# Patient Record
Sex: MALE | Race: WHITE | NOT HISPANIC OR LATINO | Employment: OTHER | URBAN - NONMETROPOLITAN AREA
[De-identification: names, ages, dates, MRNs, and addresses within clinical notes are randomized per-mention and may not be internally consistent; named-entity substitution may affect disease eponyms.]

---

## 2023-01-26 ENCOUNTER — TELEPHONE (OUTPATIENT)
Dept: MEDICAL GROUP | Facility: PHYSICIAN GROUP | Age: 63
End: 2023-01-26

## 2023-01-26 ENCOUNTER — HOSPITAL ENCOUNTER (OUTPATIENT)
Facility: MEDICAL CENTER | Age: 63
End: 2023-01-26
Attending: NURSE PRACTITIONER

## 2023-01-26 ENCOUNTER — OFFICE VISIT (OUTPATIENT)
Dept: MEDICAL GROUP | Facility: PHYSICIAN GROUP | Age: 63
End: 2023-01-26

## 2023-01-26 VITALS
HEART RATE: 80 BPM | DIASTOLIC BLOOD PRESSURE: 80 MMHG | BODY MASS INDEX: 27.06 KG/M2 | RESPIRATION RATE: 18 BRPM | SYSTOLIC BLOOD PRESSURE: 130 MMHG | OXYGEN SATURATION: 98 % | HEIGHT: 70 IN | WEIGHT: 189 LBS | TEMPERATURE: 96.8 F

## 2023-01-26 DIAGNOSIS — F51.01 PRIMARY INSOMNIA: ICD-10-CM

## 2023-01-26 DIAGNOSIS — Z13.6 SCREENING FOR CARDIOVASCULAR CONDITION: ICD-10-CM

## 2023-01-26 DIAGNOSIS — Z79.899 CHRONIC USE OF BENZODIAZEPINE FOR THERAPEUTIC PURPOSE: ICD-10-CM

## 2023-01-26 DIAGNOSIS — Z12.5 SCREENING FOR MALIGNANT NEOPLASM OF PROSTATE: ICD-10-CM

## 2023-01-26 PROCEDURE — 80307 DRUG TEST PRSMV CHEM ANLYZR: CPT

## 2023-01-26 PROCEDURE — 99203 OFFICE O/P NEW LOW 30 MIN: CPT | Performed by: NURSE PRACTITIONER

## 2023-01-26 RX ORDER — ZOLPIDEM TARTRATE 10 MG/1
10 TABLET ORAL NIGHTLY PRN
Qty: 30 TABLET | Refills: 2 | Status: SHIPPED | OUTPATIENT
Start: 2023-01-26 | End: 2023-01-26 | Stop reason: SDUPTHER

## 2023-01-26 RX ORDER — ZOLPIDEM TARTRATE 10 MG/1
10 TABLET ORAL NIGHTLY PRN
Qty: 30 TABLET | Refills: 2 | Status: SHIPPED | OUTPATIENT
Start: 2023-01-26 | End: 2023-04-26

## 2023-01-26 RX ORDER — ZOLPIDEM TARTRATE 10 MG/1
10 TABLET ORAL NIGHTLY PRN
COMMUNITY
End: 2023-01-26

## 2023-01-26 ASSESSMENT — PATIENT HEALTH QUESTIONNAIRE - PHQ9: CLINICAL INTERPRETATION OF PHQ2 SCORE: 0

## 2023-01-26 NOTE — PATIENT INSTRUCTIONS
"Good sleep hygiene:    Try to get to bed at the same time every night (even on weekends).  Plan for 8 hours of sleep.  Maintain the same waking time every morning (even on weekends).  Rumsey the bedroom for sleeping and intimacy.  Do not watch TV in the bedroom. Do not read in bed.  No screen time (TV, smart phone, tablet) within an hour of bedtime.  Develop a \"sleeping ritual\"  Keep room dark and quiet.  Run a small fan for background noise.  Try some relaxation exercises.  Avoid caffeine after noon.    "

## 2023-01-26 NOTE — TELEPHONE ENCOUNTER
Cassie at [facility] called stating they accidentally deleted the prescription you had sent on his zolpidem and request you resend it.

## 2023-01-26 NOTE — PROGRESS NOTES
CC: Establish care, insomnia    HISTORY OF THE PRESENT ILLNESS: Patient is a 62 y.o. male. This pleasant patient is here today for evaluation and management of the following health problems.    Patient's previous primary care provider is in [place] whom patient has not seen in about a year.  Patient does have insurance through his wife's employer, but he is uncertain on what providers are covered.  He is seeing me today and choosing to pay out-of-pocket.  I have urged patient to find out what providers are covered on his insurance.  Patient is retired.  Remains active with [employer] and [employer].    Health Maintenance: Had colonoscopy 2 or 3 years ago while in [place].  Records have been requested today.      Primary insomnia  Patient is 62-year-old male here to establish care with me today.  Reports long history of insomnia that was well controlled with Ambien 10 mg nightly for over 7 years.  However, he has been out of medication for the last 7 months since he moved to the area from [place].  Has been trying to manage with melatonin.  Melatonin helps with sleep initiation, but does not last through the night.  Will wake after couple hours.  When taking Ambien, denied adverse effects including sleep eating or sleepwalking.  Rarely drinks alcohol.  Retired.  Exercising and staying active during the day.  Has never had sleep study, denies snoring.    Allergies: Patient has no known allergies.    Current Outpatient Medications Ordered in Epic   Medication Sig Dispense Refill    zolpidem (AMBIEN) 10 MG Tab Take 1 Tablet by mouth at bedtime as needed for Sleep for up to 90 days. 30 Tablet 2     No current Epic-ordered facility-administered medications on file.       History reviewed. No pertinent past medical history.    Past Surgical History:   Procedure Laterality Date    ROTATOR CUFF REPAIR Right        Social History     Tobacco Use    Smoking status: Never    Smokeless tobacco: Never   Vaping Use     "Vaping Use: Never used   Substance Use Topics    Alcohol use: Not Currently    Drug use: Never       Family History   Problem Relation Age of Onset    Breast Cancer Mother     Heart Disease Father     No Known Problems Sister     Liver Cancer Brother        ROS:     - Constitutional: Negative for fever, chills, unexpected weight change, and fatigue/generalized weakness.     - HEENT: Negative for headaches, vision changes, hearing changes, ear pain, rhinorrhea, sinus congestion, and sore throat.      - Respiratory: Negative for cough, dyspnea, and wheezing.      - Cardiovascular: Negative for chest pain, palpitations, orthopnea, and bilateral lower extremity edema.     - Gastrointestinal: Negative for heartburn, nausea, vomiting, abdominal pain, hematochezia, melena, diarrhea, constipation.     - Genitourinary: Negative for dysuria, polyuria, hematuria, pyuria, urinary urgency, and urinary incontinence.    - Musculoskeletal: Negative for myalgias, back pain, and joint pain.     - Skin: Negative for rash, itching, cyanotic skin color change.     - Neurological: Negative for dizziness, tingling, tremors, focal sensory deficit, focal weakness and headaches.     - Endo/Heme/Allergies: Does not bruise/bleed easily.     - Psychiatric/Behavioral: Negative for depression, suicidal/homicidal ideation and memory loss.           Exam: /80 (BP Location: Right arm)   Pulse 80   Temp 36 °C (96.8 °F) (Temporal)   Resp 18   Ht 1.778 m (5' 10\")   Wt 85.7 kg (189 lb)   SpO2 98%  Body mass index is 27.12 kg/m².    General: Alert, pleasant, hard of hearing, well nourished, well developed male in NAD  HEENT: Normocephalic. Eyes conjunctiva clear lids without ptosis, pupils equal and reactive to light, ears normal shape and contour, canals are clear bilaterally, tympanic membranes are pearly gray with good light reflex, nasal mucosa without erythema and drainage, oropharynx is without erythema, edema or exudates.   Neck: " Supple without bruit. Thyroid is not enlarged.  Pulmonary: Clear to ausculation.  Normal effort. No rales, ronchi, or wheezing.  Cardiovascular: Normal rate and rhythm without murmur. Carotid and radial pulses are intact and equal bilaterally.  No lower extremity edema.  Abdomen: Soft, nontender, nondistended. Normal bowel sounds. Liver and spleen are not palpable  Neurologic: Grossly nonfocal  Lymph: No cervical or supraclavicular lymph nodes are palpable  Skin: Warm and dry.   Musculoskeletal: Normal gait.   Psych: Normal mood and affect. Alert and oriented. Judgment and insight is normal.    Please note that this dictation was created using voice recognition software. I have made every reasonable attempt to correct obvious errors, but I expect that there are errors of grammar and possibly content that I did not discover before finalizing the note.      Assessment/Plan  1. Primary insomnia  Chronic problem.  Tolerated Ambien in the past and was very helpful.  Reasonable to start Ambien again.  Did advise to try half a tab to see if that is has helpful as a whole tab.   reviewed.  Controlled substance agreement reviewed with patient and patient signed.  Reviewed risks of Ambien including sleep eating, sleepwalking, falls, respiratory depression.  Patient understands that risks are increased if drinking alcohol.  Patient understands not to drink alcohol while taking Ambien.  He reports he rarely drinks alcohol.  - zolpidem (AMBIEN) 10 MG Tab; Take 1 Tablet by mouth at bedtime as needed for Sleep for up to 90 days.  Dispense: 30 Tablet; Refill: 2    2. Chronic use of benzodiazepine for therapeutic purpose  This patient is continuing to use a controlled substance (CS) on a long term basis.  The patient is thoroughly aware of the goals of treatment with the CS  The patient is aware that yearly and random urine drug screens are required.  The patient has been instructed to take the CS only as prescribed.  The patient  is prohibited from sharing the CS with any other person.  The patient is instructed to inform the provider if any other CS is taken, of any alcohol or cannabis or other recreational drug use, any treatment for side effects of the CS or complications, if they have CS active rx in other states  The patient has evidence for a reason for the CS  The treatment plan has been discussed with the patient  The  report has been reviewed    - Controlled Substance Treatment Agreement  - PAIN MANAGEMENT SCRN, W/ RFLX TO QNT; Future    3. Screening for cardiovascular condition  We will review lab results with patient and next appointment.  I did advise patient to go to [facility] as he is uncertain of his insurance coverage.  Advised on routine aerobic exercise as tolerated, Mediterranean diet for heart health.  - Comp Metabolic Panel; Future  - ESTIMATED GFR; Future  - Lipid Profile; Future    4. Screening for malignant neoplasm of prostate  Reviewed risks and benefits.  - PROSTATE SPECIFIC AG SCREENING; Future    Patient will return to clinic in 3 months for medication refill and lab review or sooner if needed.

## 2023-01-26 NOTE — ASSESSMENT & PLAN NOTE
Patient is 62-year-old male here to establish care with me today.  Reports long history of insomnia that was well controlled with Ambien 10 mg nightly for over 7 years.  However, he has been out of medication for the last 7 months since he moved to the area from [place].  Has been trying to manage with melatonin.  Melatonin helps with sleep initiation, but does not last through the night.  Will wake after couple hours.  When taking Ambien, denied adverse effects including sleep eating or sleepwalking.  Rarely drinks alcohol.  Retired.  Exercising and staying active during the day.  Has never had sleep study, denies snoring.

## 2023-01-30 LAB

## 2023-04-26 ENCOUNTER — APPOINTMENT (OUTPATIENT)
Dept: MEDICAL GROUP | Facility: PHYSICIAN GROUP | Age: 63
End: 2023-04-26

## 2024-03-25 ENCOUNTER — OFFICE VISIT (OUTPATIENT)
Dept: MEDICAL GROUP | Facility: PHYSICIAN GROUP | Age: 64
End: 2024-03-25
Payer: COMMERCIAL

## 2024-03-25 VITALS
RESPIRATION RATE: 12 BRPM | TEMPERATURE: 97.7 F | DIASTOLIC BLOOD PRESSURE: 80 MMHG | HEART RATE: 75 BPM | OXYGEN SATURATION: 95 % | BODY MASS INDEX: 28.09 KG/M2 | WEIGHT: 196.21 LBS | SYSTOLIC BLOOD PRESSURE: 120 MMHG | HEIGHT: 70 IN

## 2024-03-25 DIAGNOSIS — Z11.59 NEED FOR HEPATITIS C SCREENING TEST: Primary | ICD-10-CM

## 2024-03-25 DIAGNOSIS — Z00.00 ANNUAL PHYSICAL EXAM: ICD-10-CM

## 2024-03-25 DIAGNOSIS — H90.3 HEARING LOSS SENSORY, BILATERAL: ICD-10-CM

## 2024-03-25 DIAGNOSIS — Z79.899 CHRONIC USE OF BENZODIAZEPINE FOR THERAPEUTIC PURPOSE: ICD-10-CM

## 2024-03-25 DIAGNOSIS — Z12.12 SCREENING FOR COLORECTAL CANCER: ICD-10-CM

## 2024-03-25 DIAGNOSIS — Z13.220 LIPID SCREENING: ICD-10-CM

## 2024-03-25 DIAGNOSIS — Z12.11 SCREENING FOR COLORECTAL CANCER: ICD-10-CM

## 2024-03-25 DIAGNOSIS — Z12.5 SCREENING PSA (PROSTATE SPECIFIC ANTIGEN): ICD-10-CM

## 2024-03-25 DIAGNOSIS — F51.01 PRIMARY INSOMNIA: ICD-10-CM

## 2024-03-25 DIAGNOSIS — Z12.83 SKIN CANCER SCREENING: ICD-10-CM

## 2024-03-25 DIAGNOSIS — Z85.828 HX OF BASAL CELL CARCINOMA: ICD-10-CM

## 2024-03-25 DIAGNOSIS — L98.9 SKIN LESIONS: ICD-10-CM

## 2024-03-25 PROCEDURE — 3079F DIAST BP 80-89 MM HG: CPT | Performed by: STUDENT IN AN ORGANIZED HEALTH CARE EDUCATION/TRAINING PROGRAM

## 2024-03-25 PROCEDURE — 3074F SYST BP LT 130 MM HG: CPT | Performed by: STUDENT IN AN ORGANIZED HEALTH CARE EDUCATION/TRAINING PROGRAM

## 2024-03-25 PROCEDURE — 99214 OFFICE O/P EST MOD 30 MIN: CPT | Mod: 25 | Performed by: STUDENT IN AN ORGANIZED HEALTH CARE EDUCATION/TRAINING PROGRAM

## 2024-03-25 PROCEDURE — 99386 PREV VISIT NEW AGE 40-64: CPT | Performed by: STUDENT IN AN ORGANIZED HEALTH CARE EDUCATION/TRAINING PROGRAM

## 2024-03-25 RX ORDER — TRAZODONE HYDROCHLORIDE 50 MG/1
50 TABLET ORAL NIGHTLY
Qty: 60 TABLET | Refills: 1 | Status: SHIPPED | OUTPATIENT
Start: 2024-03-25

## 2024-03-25 ASSESSMENT — PATIENT HEALTH QUESTIONNAIRE - PHQ9: CLINICAL INTERPRETATION OF PHQ2 SCORE: 0

## 2024-03-25 NOTE — PROGRESS NOTES
Verbal Consent given for KRAIG recording software    HISTORY OF PRESENT ILLNESS: Timothy is a pleasant 64 y.o. male, new  patient who presents today to discuss medical problems as listed below:    History of Present Illness  The patient is a 64-year-old male here to establish care.    He has chronic hearing loss for most of his life and would like to get a hearing test done. He has hearing aids, but they are not working out for him.    He has insomnia and has been on Ambien for 1 year. He was on it for about 2 years at the most. His prescription ran out and he moved up to [place] and got 1 more prescription after that. He has tried other sleep pills, but Ambien seemed to work the best. He took 1 pill and most of the time he took half the pill. He does not sleep throughout the night and wakes up several times. He takes 2 pills of melatonin, which works for a while. He has not tried trazodone. He denies any sleep walking disorder.    He has some skin lesions. His wife is worried about them. He saw a dermatologist in [place] and they removed a basal cell from his nose. He usually wears a hat, but does not wear sunscreen. He has seen a dermatologist in [facility].    He does not have high blood pressure. He is a little nervous coming up here. He denies any chest pain, shortness of breath, dizziness, severe headaches, swelling in his legs, black or bloody stools, or burning with urination. He exercises pretty well.    He has allergies. He takes Benadryl once in a while. He feels a little bit congested in his sinuses. He occasionally uses a nasal spray at night.    Supplemental Information  He had a colonoscopy 10 years ago.   He does not vape or smoke. He rarely drinks alcohol.   His mother has breast cancer. His father had heart disease. He denies any family history of colon cancer.   He has not had a tetanus vaccine in the last 10 years. He has not had a shingles vaccine. He has seen a dentist in [place].       Current  "Outpatient Medications Ordered in Epic   Medication Sig Dispense Refill    traZODone (DESYREL) 50 MG Tab Take 1 Tablet by mouth every evening. 60 Tablet 1     No current Epic-ordered facility-administered medications on file.       Review of systems:  Per HPI    Patient Active Problem List    Diagnosis Date Noted    Hearing loss sensory, bilateral 03/25/2024    Hx of basal cell carcinoma 03/25/2024    Primary insomnia 01/26/2023     Past Surgical History:   Procedure Laterality Date    ROTATOR CUFF REPAIR Right      Social History     Tobacco Use    Smoking status: Never    Smokeless tobacco: Never   Vaping Use    Vaping Use: Never used   Substance Use Topics    Alcohol use: Not Currently    Drug use: Never      Family History   Problem Relation Age of Onset    Breast Cancer Mother     Heart Disease Father     No Known Problems Sister     Liver Cancer Brother      Current Outpatient Medications   Medication Sig Dispense Refill    traZODone (DESYREL) 50 MG Tab Take 1 Tablet by mouth every evening. 60 Tablet 1     No current facility-administered medications for this visit.       Allergies:  No Known Allergies    Allergies, past medical history, past surgical history, family history, social history reviewed and updated.    Objective:    /80   Pulse 75   Temp 36.5 °C (97.7 °F) (Temporal)   Resp 12   Ht 1.778 m (5' 10\")   Wt 89 kg (196 lb 3.4 oz)   SpO2 95%   BMI 28.15 kg/m²    Body mass index is 28.15 kg/m².    Physical exam:  General: Normal appearance, no acute distress, not ill-appearing  HEENT: EOM intact, conjunctiva normal limits, negative right/left eye discharge.  Sclera anicteric  Cardiovascular: Normal rate and rhythm, no murmurs  Pulmonary: No respiratory distress, no wheezing, no rales, breath sounds normal.  Abdomen: Bowel sounds normal, flat, soft.  Musculoskeletal: No edema bilaterally  Skin: Warm, dry, no lesions  Neurological: No focal deficits, normal gait  Psychiatric: Mood within normal " limits    Assessment/Plan:    Assessment & Plan  1. Chronic hearing loss.  I will refer him to audiologist.    2. Insomnia.  We discussed the risks of long-term use of Ambien. I will prescribe melatonin and trazodone. He was advised to exercise in the daytime and sleep hygiene habits.  If after 2 months he will having issues with sleep and insomnia we will put him back on the Ambien.    Chronic issue not well-controlled medication management    3.  History of skin cancer  We will refer him to a dermatologist.    4. Health maintenance.  He declines influenza, tetanus, and shingles vaccines. I will order basic labs,I will order a hepatitis C screening. He declines STD and HIV testing. I will order a Cologuard.    5. Mild wheezing.  This is probably from his allergies. He was advised to take Zyrtec daily. If he ever gets short of breath, he will let me know and he might need an inhaler.    Follow-up  The patient will follow up in 2 months.   Patient here for a preventive medicine visit today and to establish care.  -Reviewed all past medical history, family history, social history    -Diet and exercise appropriate counseling given  -Social determinants of health reviewed  -Tobacco, alcohol, recreational drug use: Reviewed no concerns  -Cholesterol screening: Ordered  -Diabetes screening: Fasting glucose ordered  -AAA Screening: not indicated      Problem List Items Addressed This Visit       Primary insomnia    Relevant Medications    traZODone (DESYREL) 50 MG Tab    Hearing loss sensory, bilateral    Relevant Orders    Referral to Audiology    Hx of basal cell carcinoma    Relevant Orders    Referral to Dermatology     Other Visit Diagnoses       Need for hepatitis C screening test    -  Primary    Relevant Orders    HEP C VIRUS ANTIBODY    Chronic use of benzodiazepine for therapeutic purpose        Lipid screening        Relevant Orders    Lipid Profile    Annual physical exam        Relevant Orders    CBC WITHOUT  DIFFERENTIAL    Comp Metabolic Panel    Skin lesions        Skin cancer screening        Relevant Orders    Referral to Dermatology    Screening for colorectal cancer        Relevant Orders    COLOGUARD (FIT DNA)    Screening PSA (prostate specific antigen)        Relevant Orders    PROSTATE SPECIFIC AG SCREENING          Patient Counseling:  --Discussed moderation in caloric intake, sufficient fresh fruits/vegetables, fiber, iron  --Discussed brushing, flossing, and dental visits.   --Encouraged regular exercise.   --Discussed tobacco, alcohol, or other drug use.  --Discussed sexually transmitted infections, partner selection  --Injury prevention: Discussed     No follow-ups on file.

## 2024-03-29 ENCOUNTER — HOSPITAL ENCOUNTER (OUTPATIENT)
Dept: LAB | Facility: MEDICAL CENTER | Age: 64
End: 2024-03-29
Attending: STUDENT IN AN ORGANIZED HEALTH CARE EDUCATION/TRAINING PROGRAM
Payer: COMMERCIAL

## 2024-03-29 DIAGNOSIS — Z00.00 ANNUAL PHYSICAL EXAM: ICD-10-CM

## 2024-03-29 DIAGNOSIS — Z13.220 LIPID SCREENING: ICD-10-CM

## 2024-03-29 DIAGNOSIS — Z12.5 SCREENING PSA (PROSTATE SPECIFIC ANTIGEN): ICD-10-CM

## 2024-03-29 DIAGNOSIS — Z11.59 NEED FOR HEPATITIS C SCREENING TEST: ICD-10-CM

## 2024-03-29 LAB
ALBUMIN SERPL BCP-MCNC: 4.5 G/DL (ref 3.2–4.9)
ALBUMIN/GLOB SERPL: 1.7 G/DL
ALP SERPL-CCNC: 91 U/L (ref 30–99)
ALT SERPL-CCNC: 54 U/L (ref 2–50)
ANION GAP SERPL CALC-SCNC: 14 MMOL/L (ref 7–16)
AST SERPL-CCNC: 53 U/L (ref 12–45)
BILIRUB SERPL-MCNC: 0.4 MG/DL (ref 0.1–1.5)
BUN SERPL-MCNC: 14 MG/DL (ref 8–22)
CALCIUM ALBUM COR SERPL-MCNC: 8.8 MG/DL (ref 8.5–10.5)
CALCIUM SERPL-MCNC: 9.2 MG/DL (ref 8.5–10.5)
CHLORIDE SERPL-SCNC: 107 MMOL/L (ref 96–112)
CHOLEST SERPL-MCNC: 221 MG/DL (ref 100–199)
CO2 SERPL-SCNC: 24 MMOL/L (ref 20–33)
CREAT SERPL-MCNC: 0.88 MG/DL (ref 0.5–1.4)
ERYTHROCYTE [DISTWIDTH] IN BLOOD BY AUTOMATED COUNT: 41.5 FL (ref 35.9–50)
FASTING STATUS PATIENT QL REPORTED: NORMAL
GFR SERPLBLD CREATININE-BSD FMLA CKD-EPI: 96 ML/MIN/1.73 M 2
GLOBULIN SER CALC-MCNC: 2.7 G/DL (ref 1.9–3.5)
GLUCOSE SERPL-MCNC: 99 MG/DL (ref 65–99)
HCT VFR BLD AUTO: 43.7 % (ref 42–52)
HCV AB SER QL: NORMAL
HDLC SERPL-MCNC: 35 MG/DL
HGB BLD-MCNC: 14.6 G/DL (ref 14–18)
LDLC SERPL CALC-MCNC: 128 MG/DL
MCH RBC QN AUTO: 29 PG (ref 27–33)
MCHC RBC AUTO-ENTMCNC: 33.4 G/DL (ref 32.3–36.5)
MCV RBC AUTO: 86.7 FL (ref 81.4–97.8)
PLATELET # BLD AUTO: 208 K/UL (ref 164–446)
PMV BLD AUTO: 10.1 FL (ref 9–12.9)
POTASSIUM SERPL-SCNC: 4.2 MMOL/L (ref 3.6–5.5)
PROT SERPL-MCNC: 7.2 G/DL (ref 6–8.2)
PSA SERPL-MCNC: 0.92 NG/ML (ref 0–4)
RBC # BLD AUTO: 5.04 M/UL (ref 4.7–6.1)
SODIUM SERPL-SCNC: 145 MMOL/L (ref 135–145)
TRIGL SERPL-MCNC: 290 MG/DL (ref 0–149)
WBC # BLD AUTO: 5.4 K/UL (ref 4.8–10.8)

## 2024-03-29 PROCEDURE — 36415 COLL VENOUS BLD VENIPUNCTURE: CPT

## 2024-03-29 PROCEDURE — 85027 COMPLETE CBC AUTOMATED: CPT

## 2024-03-29 PROCEDURE — 80061 LIPID PANEL: CPT

## 2024-03-29 PROCEDURE — 86803 HEPATITIS C AB TEST: CPT

## 2024-03-29 PROCEDURE — 84153 ASSAY OF PSA TOTAL: CPT

## 2024-03-29 PROCEDURE — 80053 COMPREHEN METABOLIC PANEL: CPT

## 2024-05-28 ENCOUNTER — OFFICE VISIT (OUTPATIENT)
Dept: MEDICAL GROUP | Facility: PHYSICIAN GROUP | Age: 64
End: 2024-05-28

## 2024-05-28 VITALS
HEART RATE: 83 BPM | BODY MASS INDEX: 26.51 KG/M2 | WEIGHT: 185.19 LBS | RESPIRATION RATE: 14 BRPM | HEIGHT: 70 IN | DIASTOLIC BLOOD PRESSURE: 80 MMHG | TEMPERATURE: 97.3 F | SYSTOLIC BLOOD PRESSURE: 128 MMHG | OXYGEN SATURATION: 96 %

## 2024-05-28 DIAGNOSIS — E78.00 HYPERCHOLESTEREMIA: ICD-10-CM

## 2024-05-28 DIAGNOSIS — R74.01 ELEVATED TRANSAMINASE LEVEL: ICD-10-CM

## 2024-05-28 PROCEDURE — 99214 OFFICE O/P EST MOD 30 MIN: CPT | Performed by: STUDENT IN AN ORGANIZED HEALTH CARE EDUCATION/TRAINING PROGRAM

## 2024-05-28 PROCEDURE — 3074F SYST BP LT 130 MM HG: CPT | Performed by: STUDENT IN AN ORGANIZED HEALTH CARE EDUCATION/TRAINING PROGRAM

## 2024-05-28 PROCEDURE — 3079F DIAST BP 80-89 MM HG: CPT | Performed by: STUDENT IN AN ORGANIZED HEALTH CARE EDUCATION/TRAINING PROGRAM

## 2024-05-28 ASSESSMENT — FIBROSIS 4 INDEX: FIB4 SCORE: 2.22

## 2024-05-28 NOTE — ASSESSMENT & PLAN NOTE
14.1%    Risk of cardiovascular event (coronary or stroke death or non-fatal MI or stroke) in next 10 years.    8.1%    10-year cardiovascular risk if risk factors were optimal.      INPUTS:  Age --> 64 years  Diabetes --> 0 = No  Sex --> 1 = Male  Smoker --> 0 = No  Total cholesterol --> 221 mg/dL  HDL cholesterol --> 35 mg/dL  Systolic blood pressure --> 120 mm Hg  Treatment for hypertension --> 0 = No  Race --> 1 = White

## 2024-05-29 RX ORDER — ATORVASTATIN CALCIUM 10 MG/1
10 TABLET, FILM COATED ORAL NIGHTLY
Qty: 60 TABLET | Refills: 0 | Status: SHIPPED | OUTPATIENT
Start: 2024-05-29

## 2024-05-29 ASSESSMENT — ENCOUNTER SYMPTOMS
ABDOMINAL PAIN: 0
HEARTBURN: 0
FEVER: 0
SHORTNESS OF BREATH: 0
PALPITATIONS: 0
COUGH: 0
FOCAL WEAKNESS: 0
HEADACHES: 0
CHILLS: 0
WHEEZING: 0
DIZZINESS: 0
ORTHOPNEA: 0

## 2024-05-29 NOTE — PROGRESS NOTES
Verbal Consent given for KRAIG recording software    HISTORY OF PRESENT ILLNESS: Timothy is a pleasant 64 y.o. male, established patient who presents today to discuss medical problems as listed below:    History of Present Illness  The patient is a 64-year-old male here following up on his labs.    The patient has recently initiated a new diet as recommended by his wife, as evidenced by his recent lab results. He denies any history of hypertension or diabetes. His previous dietary habits included bustillo, eggs, and fried foods. His exercise regimen has been inconsistent recently, although he acknowledges the need for regular physical activity. His insomnia is well-managed with Trazodone, and he reports increased sleep quality. He plans to consult his dermatologist regarding his history of basal cell carcinoma. His audiologist has noted a decline in his hearing, prompting the recommendation for a screening test for implantation. He denies experiencing chest pain or leg swelling.    The patient suspects an allergic reaction to ragweed, which he attributes to yard work. He has taken Benadryl for symptom relief.   He does not smoke.   He denies any family history of high cholesterol or heart disease.       Current Outpatient Medications Ordered in Epic   Medication Sig Dispense Refill    atorvastatin (LIPITOR) 10 MG Tab Take 1 Tablet by mouth every evening. For elevated cholesterol 60 Tablet 0    traZODone (DESYREL) 50 MG Tab Take 1 Tablet by mouth every evening. 60 Tablet 1     No current Epic-ordered facility-administered medications on file.       Review of systems:  Review of Systems   Constitutional:  Negative for chills and fever.   Respiratory:  Negative for cough, shortness of breath and wheezing.    Cardiovascular:  Negative for chest pain, palpitations, orthopnea and leg swelling.   Gastrointestinal:  Negative for abdominal pain and heartburn.   Musculoskeletal:  Negative for joint pain.   Neurological:  Negative  "for dizziness, focal weakness and headaches.         Patient Active Problem List    Diagnosis Date Noted    Hypercholesteremia 05/28/2024    Hearing loss sensory, bilateral 03/25/2024    Hx of basal cell carcinoma 03/25/2024    Primary insomnia 01/26/2023     Past Surgical History:   Procedure Laterality Date    ROTATOR CUFF REPAIR Right      Social History     Tobacco Use    Smoking status: Never    Smokeless tobacco: Never   Vaping Use    Vaping status: Never Used   Substance Use Topics    Alcohol use: Not Currently    Drug use: Never      Family History   Problem Relation Age of Onset    Breast Cancer Mother     Heart Disease Father     No Known Problems Sister     Liver Cancer Brother      Current Outpatient Medications   Medication Sig Dispense Refill    atorvastatin (LIPITOR) 10 MG Tab Take 1 Tablet by mouth every evening. For elevated cholesterol 60 Tablet 0    traZODone (DESYREL) 50 MG Tab Take 1 Tablet by mouth every evening. 60 Tablet 1     No current facility-administered medications for this visit.       Allergies:  No Known Allergies    Allergies, past medical history, past surgical history, family history, social history reviewed and updated.    Objective:    /80   Pulse 83   Temp 36.3 °C (97.3 °F) (Temporal)   Resp 14   Ht 1.778 m (5' 10\")   Wt 84 kg (185 lb 3 oz)   SpO2 96%   BMI 26.57 kg/m²    Body mass index is 26.57 kg/m².    Physical exam:  General: Normal appearance, no acute distress, not ill-appearing  HEENT: EOM intact, conjunctiva normal limits, negative right/left eye discharge.  Sclera anicteric  Cardiovascular: Normal rate and rhythm, no murmurs  Pulmonary: No respiratory distress, no wheezing, no rales, breath sounds normal.  Musculoskeletal: No edema bilaterally  Skin: Warm, dry, no lesions  Neurological: No focal deficits, normal gait  Psychiatric: Mood within normal limits       Latest Reference Range & Units 03/29/24 08:51   WBC 4.8 - 10.8 K/uL 5.4   RBC 4.70 - 6.10 M/uL " 5.04   Hemoglobin 14.0 - 18.0 g/dL 14.6   Hematocrit 42.0 - 52.0 % 43.7   MCV 81.4 - 97.8 fL 86.7   MCH 27.0 - 33.0 pg 29.0   MCHC 32.3 - 36.5 g/dL 33.4   RDW 35.9 - 50.0 fL 41.5   Platelet Count 164 - 446 K/uL 208   MPV 9.0 - 12.9 fL 10.1   Sodium 135 - 145 mmol/L 145   Potassium 3.6 - 5.5 mmol/L 4.2   Chloride 96 - 112 mmol/L 107   Co2 20 - 33 mmol/L 24   Anion Gap 7.0 - 16.0  14.0   Glucose 65 - 99 mg/dL 99   Bun 8 - 22 mg/dL 14   Creatinine 0.50 - 1.40 mg/dL 0.88   GFR (CKD-EPI) >60 mL/min/1.73 m 2 96   Calcium 8.5 - 10.5 mg/dL 9.2   Correct Calcium 8.5 - 10.5 mg/dL 8.8   AST(SGOT) 12 - 45 U/L 53 (H)   ALT(SGPT) 2 - 50 U/L 54 (H)   Alkaline Phosphatase 30 - 99 U/L 91   Total Bilirubin 0.1 - 1.5 mg/dL 0.4   Albumin 3.2 - 4.9 g/dL 4.5   Total Protein 6.0 - 8.2 g/dL 7.2   Globulin 1.9 - 3.5 g/dL 2.7   A-G Ratio g/dL 1.7   Fasting Status  Fasting   Cholesterol,Tot 100 - 199 mg/dL 221 (H)   Triglycerides 0 - 149 mg/dL 290 (H)   HDL >=40 mg/dL 35 !   LDL <100 mg/dL 128 (H)   (H): Data is abnormally high  !: Data is abnormal    Assessment/Plan:    Assessment & Plan  1. Elevated cholesterol.  The patient's LDL levels are low, while the HDL levels are slightly elevated.  ASCVD risk 14%.  Recommend Minitran diet, exercise.  Due to elevated liver enzymes and suspected fatty liver disease we will start medication therapy.  Start atorvastatin 10 mg nightly.    2.  Elevated liver enzymes  AST 53, ALT 54  HDL 35,     I suspect this is secondary to fatty liver disease.  Repeat CMP with lipid panel.  If still elevated obtain right upper quadrant ultrasound and further labs to rule out other intrinsic liver disease.    Follow-up  A follow-up visit is scheduled for 1 year from now, or earlier if necessary.       Problem List Items Addressed This Visit       Hypercholesteremia     14.1%    Risk of cardiovascular event (coronary or stroke death or non-fatal MI or stroke) in next 10 years.    8.1%    10-year cardiovascular  risk if risk factors were optimal.      INPUTS:  Age --> 64 years  Diabetes --> 0 = No  Sex --> 1 = Male  Smoker --> 0 = No  Total cholesterol --> 221 mg/dL  HDL cholesterol --> 35 mg/dL  Systolic blood pressure --> 120 mm Hg  Treatment for hypertension --> 0 = No  Race --> 1 = White               Relevant Medications    atorvastatin (LIPITOR) 10 MG Tab    Other Relevant Orders    Lipid Profile     Other Visit Diagnoses       Elevated transaminase level        Relevant Orders    Comp Metabolic Panel    HEP B SURFACE AB    HEP B SURFACE ANTIGEN            Return in about 6 months (around 11/28/2024).

## 2024-09-27 ENCOUNTER — OFFICE VISIT (OUTPATIENT)
Dept: URGENT CARE | Facility: PHYSICIAN GROUP | Age: 64
End: 2024-09-27

## 2024-09-27 VITALS
RESPIRATION RATE: 16 BRPM | DIASTOLIC BLOOD PRESSURE: 90 MMHG | TEMPERATURE: 96.8 F | HEIGHT: 70 IN | WEIGHT: 190 LBS | SYSTOLIC BLOOD PRESSURE: 130 MMHG | OXYGEN SATURATION: 95 % | HEART RATE: 71 BPM | BODY MASS INDEX: 27.2 KG/M2

## 2024-09-27 DIAGNOSIS — Z02.89 ENCOUNTER FOR EXAMINATION REQUIRED BY DEPARTMENT OF TRANSPORTATION (DOT): ICD-10-CM

## 2024-09-27 ASSESSMENT — FIBROSIS 4 INDEX: FIB4 SCORE: 2.22

## 2024-09-27 NOTE — PROGRESS NOTES
Patient here for DOT physical.  He takes no medications on a daily basis, has no concerning chronic medical problems, and is overall doing well.  He has a normal physical exam in office and is cleared for 2-year certificate.  Please see scanned forms in chart.

## 2025-04-09 ENCOUNTER — OFFICE VISIT (OUTPATIENT)
Dept: URGENT CARE | Facility: PHYSICIAN GROUP | Age: 65
End: 2025-04-09
Payer: MEDICARE

## 2025-04-09 VITALS
DIASTOLIC BLOOD PRESSURE: 88 MMHG | OXYGEN SATURATION: 95 % | RESPIRATION RATE: 16 BRPM | BODY MASS INDEX: 26.74 KG/M2 | TEMPERATURE: 98.4 F | HEIGHT: 71 IN | SYSTOLIC BLOOD PRESSURE: 128 MMHG | WEIGHT: 191 LBS | HEART RATE: 64 BPM

## 2025-04-09 DIAGNOSIS — M77.11 EPICONDYLITIS, LATERAL, RIGHT: ICD-10-CM

## 2025-04-09 PROCEDURE — 3079F DIAST BP 80-89 MM HG: CPT | Performed by: NURSE PRACTITIONER

## 2025-04-09 PROCEDURE — 3074F SYST BP LT 130 MM HG: CPT | Performed by: NURSE PRACTITIONER

## 2025-04-09 PROCEDURE — 99213 OFFICE O/P EST LOW 20 MIN: CPT | Performed by: NURSE PRACTITIONER

## 2025-04-09 RX ORDER — NAPROXEN 500 MG/1
500 TABLET ORAL 2 TIMES DAILY WITH MEALS
Qty: 30 TABLET | Refills: 0 | Status: SHIPPED | OUTPATIENT
Start: 2025-04-09

## 2025-04-09 RX ORDER — CYCLOBENZAPRINE HCL 5 MG
5-10 TABLET ORAL 3 TIMES DAILY PRN
Qty: 30 TABLET | Refills: 0 | Status: SHIPPED | OUTPATIENT
Start: 2025-04-09

## 2025-04-09 ASSESSMENT — FIBROSIS 4 INDEX: FIB4 SCORE: 2.25

## 2025-04-09 NOTE — PROGRESS NOTES
"Subjective:   Timothy Varghese is a 65 y.o. male who presents for Arm Pain (R forearm )      Patient is a 65-year-old male presenting today with his wife reporting 2-month history of waxing and waning right lateral elbow pain extending down into his forearm.  Patient states over the past week it is progressively worsened after doing yard work.  He denies any weakness in his hand, numbness, or tingling.  Patient states that it worsens with raising his arm or grasping.  He denies any known injury or trauma.  He has tried some Tylenol which has helped some with symptoms    Medications, Allergies, and current problem list reviewed today in Epic.     Objective:     /88   Pulse 64   Temp 36.9 °C (98.4 °F) (Temporal)   Resp 16   Ht 1.803 m (5' 11\")   Wt 86.6 kg (191 lb)   SpO2 95%     Physical Exam  Vitals reviewed.   Constitutional:       Appearance: Normal appearance.   HENT:      Head: Normocephalic.      Nose: Nose normal.      Mouth/Throat:      Mouth: Mucous membranes are moist.   Eyes:      Extraocular Movements: Extraocular movements intact.      Conjunctiva/sclera: Conjunctivae normal.      Pupils: Pupils are equal, round, and reactive to light.   Cardiovascular:      Rate and Rhythm: Normal rate.   Pulmonary:      Effort: Pulmonary effort is normal.   Musculoskeletal:         General: Normal range of motion.      Cervical back: Normal range of motion and neck supple.      Comments: Elbow: No deformity, swelling or bruising noted. Tenderness to palpation on lateral epicondyle tendon. Full range of motion bilaterally. , 5/5 strength bilaterally.  Distal neurovascular intact.     Skin:     General: Skin is warm and dry.   Neurological:      General: No focal deficit present.      Mental Status: He is alert and oriented to person, place, and time.   Psychiatric:         Mood and Affect: Mood normal.         Behavior: Behavior normal.         Assessment/Plan:     Diagnosis and associated orders:     1. " Epicondylitis, lateral, right  naproxen (NAPROSYN) 500 MG Tab    cyclobenzaprine (FLEXERIL) 5 mg tablet         Comments/MDM:     This is an acute condition.  Patient presents to clinic today with right lateral epicondylitis.  Recommended cryotherapy, may use elbow epicondyle brace.  Prescription of naproxen and Flexeril sent to pharmacy.  Side effects medication were discussed.  May use over-the-counter Voltaren gel and Tylenol in addition if needed.  Did discuss and offer referral to physical therapy and sports medicine however patient politely declined.  Recommended following back up in clinic with primary care as needed.  Patient was involved with shared decision-making throughout the exam today and verbalizes understanding regards to plan of care, discharge instructions, and follow-up         Differential diagnosis, natural history, supportive care, and indications for immediate follow-up discussed.    Advised the patient to follow-up with the primary care physician for recheck, reevaluation, and consideration of further management.    I personally reviewed prior external notes and test results pertinent to today's visit as well as additional imaging and testing completed in clinic today.     Please note that this dictation was created using voice recognition software. I have made a reasonable attempt to correct obvious errors, but I expect that there are errors of grammar and possibly content that I did not discover before finalizing the note.